# Patient Record
Sex: FEMALE | Race: WHITE | NOT HISPANIC OR LATINO | Employment: OTHER | URBAN - METROPOLITAN AREA
[De-identification: names, ages, dates, MRNs, and addresses within clinical notes are randomized per-mention and may not be internally consistent; named-entity substitution may affect disease eponyms.]

---

## 2024-04-20 ENCOUNTER — OFFICE VISIT (OUTPATIENT)
Dept: URGENT CARE | Facility: CLINIC | Age: 69
End: 2024-04-20
Payer: MEDICARE

## 2024-04-20 VITALS
RESPIRATION RATE: 20 BRPM | HEART RATE: 137 BPM | WEIGHT: 146.19 LBS | TEMPERATURE: 98 F | HEIGHT: 64 IN | SYSTOLIC BLOOD PRESSURE: 122 MMHG | BODY MASS INDEX: 24.96 KG/M2 | OXYGEN SATURATION: 96 % | DIASTOLIC BLOOD PRESSURE: 75 MMHG

## 2024-04-20 DIAGNOSIS — J01.90 ACUTE BACTERIAL SINUSITIS: Primary | ICD-10-CM

## 2024-04-20 DIAGNOSIS — R05.9 COUGH, UNSPECIFIED TYPE: ICD-10-CM

## 2024-04-20 DIAGNOSIS — B96.89 ACUTE BACTERIAL SINUSITIS: Primary | ICD-10-CM

## 2024-04-20 PROCEDURE — 99203 OFFICE O/P NEW LOW 30 MIN: CPT | Mod: S$GLB,,, | Performed by: NURSE PRACTITIONER

## 2024-04-20 RX ORDER — VENLAFAXINE HYDROCHLORIDE 75 MG/1
1 TABLET, EXTENDED RELEASE ORAL
COMMUNITY

## 2024-04-20 RX ORDER — AZELASTINE 1 MG/ML
1 SPRAY, METERED NASAL
COMMUNITY
Start: 2024-04-17 | End: 2024-05-01

## 2024-04-20 RX ORDER — PROMETHAZINE HYDROCHLORIDE AND DEXTROMETHORPHAN HYDROBROMIDE 6.25; 15 MG/5ML; MG/5ML
5 SYRUP ORAL EVERY 6 HOURS PRN
Qty: 120 ML | Refills: 0 | Status: SHIPPED | OUTPATIENT
Start: 2024-04-20 | End: 2024-04-30

## 2024-04-20 RX ORDER — ATORVASTATIN CALCIUM 20 MG/1
20 TABLET, FILM COATED ORAL
COMMUNITY

## 2024-04-20 RX ORDER — TRAZODONE HYDROCHLORIDE 50 MG/1
TABLET ORAL
COMMUNITY
Start: 2024-03-25

## 2024-04-20 RX ORDER — AZITHROMYCIN 250 MG/1
TABLET, FILM COATED ORAL
Qty: 6 TABLET | Refills: 0 | Status: SHIPPED | OUTPATIENT
Start: 2024-04-20

## 2024-04-20 RX ORDER — FLUTICASONE PROPIONATE 50 MCG
SPRAY, SUSPENSION (ML) NASAL
COMMUNITY

## 2024-04-20 NOTE — PROGRESS NOTES
"Subjective:      Patient ID: Elisa Zimmerman is a 69 y.o. female.    Vitals:  height is 5' 4" (1.626 m) and weight is 66.3 kg (146 lb 2.6 oz). Her oral temperature is 98 °F (36.7 °C). Her blood pressure is 122/75 and her pulse is 137 (abnormal). Her respiration is 20 and oxygen saturation is 96%.     Chief Complaint: Cough (Pt states that her symptoms started on 5. Patient states that her symptoms are the following: hoarse, cough, sore throat (resolved), post nasal drip, nasal congestion, bodyache, back pain w/ coughing.pt has taken the following to treat: claritin, 2 nose sprays, steroid shot, mucinex, nightquil)    This is a 69 y.o. female who presents today with a chief complaint of Cough: pt experiencing chest congestion with persistent cough, nasal congestion, headache and overall not feeling well that started six days ago. Pt was treated with nasal spray and corticosteroid a few days ago with no relief. She reports a worsening of chest congestion and cough over the past two days.       Patient presents with:  Cough:     Cough  This is a new problem. The current episode started in the past 7 days. The problem has been gradually worsening. The problem occurs every few minutes. The cough is Productive of sputum. Associated symptoms include nasal congestion, postnasal drip and a sore throat. Pertinent negatives include no shortness of breath or wheezing. Associated symptoms comments: Body ache, back pain w/ cough, hoarse. Treatments tried: claritin, 2 nose sprays, steroid shot, mucinex, nightquil. The treatment provided no relief.       Constitution: Negative.   HENT:  Positive for congestion, postnasal drip and sore throat.    Respiratory:  Positive for cough and sputum production. Negative for shortness of breath and wheezing.       Objective:     Physical Exam   Constitutional: She is oriented to person, place, and time. She appears well-developed. She is cooperative.  Non-toxic appearance. She does not appear " ill. No distress.   HENT:   Head: Normocephalic and atraumatic.   Ears:   Right Ear: Hearing, tympanic membrane, external ear and ear canal normal.   Left Ear: Hearing, tympanic membrane, external ear and ear canal normal.   Nose: Nose normal. No mucosal edema, rhinorrhea or nasal deformity. No epistaxis. Right sinus exhibits no maxillary sinus tenderness and no frontal sinus tenderness. Left sinus exhibits no maxillary sinus tenderness and no frontal sinus tenderness.   Mouth/Throat: Uvula is midline, oropharynx is clear and moist and mucous membranes are normal. No trismus in the jaw. Normal dentition. No uvula swelling. No oropharyngeal exudate, posterior oropharyngeal edema or posterior oropharyngeal erythema.   Eyes: Conjunctivae and lids are normal. No scleral icterus.   Neck: Trachea normal and phonation normal. Neck supple. No edema present. No erythema present. No neck rigidity present.   Cardiovascular: Normal rate, regular rhythm, normal heart sounds and normal pulses.   Pulmonary/Chest: Effort normal. No respiratory distress. She has no decreased breath sounds. She has no wheezes. She has rhonchi (mild RUL).   Abdominal: Normal appearance.   Musculoskeletal: Normal range of motion.         General: No deformity. Normal range of motion.   Neurological: She is alert and oriented to person, place, and time. She exhibits normal muscle tone. Coordination normal.   Skin: Skin is warm, dry, intact, not diaphoretic and not pale.   Psychiatric: Her speech is normal and behavior is normal. Judgment and thought content normal.   Nursing note and vitals reviewed.    XR CHEST PA AND LATERAL  Narrative: EXAMINATION:  XR CHEST PA AND LATERAL    CLINICAL HISTORY:  Cough, unspecified    TECHNIQUE:  PA and lateral views of the chest were performed.    COMPARISON:  None    FINDINGS:  Elevation of the probable left basilar atelectasis.  Otherwise lungs are clear.  No pneumothorax or pleural effusion.    Normal  cardiomediastinal contour.    No acute or aggressive osseous abnormality.  Impression: No acute cardiopulmonary abnormality.    Electronically signed by: Elizabeth Osmany  Date:    04/20/2024  Time:    09:45     Assessment:     1. Acute bacterial sinusitis    2. Cough, unspecified type        Plan:       Acute bacterial sinusitis  -     azithromycin (Z-CHENG) 250 MG tablet; Take 2 tablets by mouth on day 1; Then Take 1 tablet by mouth on days 2-5  Dispense: 6 tablet; Refill: 0    Cough, unspecified type  -     XR CHEST PA AND LATERAL; Future; Expected date: 04/20/2024  -     promethazine-dextromethorphan (PROMETHAZINE-DM) 6.25-15 mg/5 mL Syrp; Take 5 mLs by mouth every 6 (six) hours as needed (cough).  Dispense: 120 mL; Refill: 0      Patient Instructions   Please return here or go to the Emergency Department for any concerns or worsening of condition.  Please drink plenty of fluids.  Please get plenty of rest.  If you were prescribed antibiotics, please take them to completion.  If you do not have Hypertension or any history of palpitations, it is ok to take over the counter Sudafed or Mucinex D or Allegra-D or Claritin-D or Zyrtec-D.  If you do take one of the above, it is ok to combine that with plain over the counter Mucinex or Allegra or Claritin or Zyrtec.  If for example you are taking Zyrtec -D, you can combine that with Mucinex, but not Mucinex-D.  If you are taking Mucinex-D, you can combine that with plain Allegra or Claritin or Zyrtec.   If you do have Hypertension or palpitations, it is safe to take Coricidin HBP for relief of sinus symptoms.  If not allergic, please take over the counter Tylenol (Acetaminophen) and/or Motrin (Ibuprofen) as directed for control of pain and/or fever.  Please follow up with your primary care doctor or specialist as needed.    If you  smoke, please stop smoking.          BRENDAN Lei